# Patient Record
Sex: FEMALE
[De-identification: names, ages, dates, MRNs, and addresses within clinical notes are randomized per-mention and may not be internally consistent; named-entity substitution may affect disease eponyms.]

---

## 2020-11-25 ENCOUNTER — NON-APPOINTMENT (OUTPATIENT)
Age: 65
End: 2020-11-25

## 2020-11-25 DIAGNOSIS — I10 ESSENTIAL (PRIMARY) HYPERTENSION: ICD-10-CM

## 2020-11-25 DIAGNOSIS — Z82.49 FAMILY HISTORY OF ISCHEMIC HEART DISEASE AND OTHER DISEASES OF THE CIRCULATORY SYSTEM: ICD-10-CM

## 2020-11-25 DIAGNOSIS — Z78.9 OTHER SPECIFIED HEALTH STATUS: ICD-10-CM

## 2020-11-25 RX ORDER — ATORVASTATIN CALCIUM 40 MG/1
40 TABLET, FILM COATED ORAL DAILY
Refills: 0 | Status: ACTIVE | COMMUNITY

## 2020-11-25 RX ORDER — ASPIRIN ENTERIC COATED TABLETS 81 MG 81 MG/1
81 TABLET, DELAYED RELEASE ORAL DAILY
Refills: 0 | Status: ACTIVE | COMMUNITY

## 2020-11-25 RX ORDER — LEVOTHYROXINE SODIUM 0.1 MG/1
100 TABLET ORAL DAILY
Refills: 0 | Status: ACTIVE | COMMUNITY

## 2021-03-17 ENCOUNTER — APPOINTMENT (OUTPATIENT)
Dept: PULMONOLOGY | Facility: CLINIC | Age: 66
End: 2021-03-17
Payer: MEDICARE

## 2021-03-17 VITALS
HEART RATE: 89 BPM | DIASTOLIC BLOOD PRESSURE: 84 MMHG | OXYGEN SATURATION: 96 % | SYSTOLIC BLOOD PRESSURE: 177 MMHG | TEMPERATURE: 97.3 F

## 2021-03-17 PROCEDURE — 99204 OFFICE O/P NEW MOD 45 MIN: CPT

## 2021-03-17 PROCEDURE — 99214 OFFICE O/P EST MOD 30 MIN: CPT

## 2021-03-17 NOTE — PHYSICAL EXAM
[No Acute Distress] : no acute distress [Normal Appearance] : normal appearance [Normal Rate/Rhythm] : normal rate/rhythm [Normal S1, S2] : normal s1, s2 [No Murmurs] : no murmurs [No Resp Distress] : no resp distress [Clear to Auscultation Bilaterally] : clear to auscultation bilaterally [Benign] : benign [Normal Gait] : normal gait [No Clubbing] : no clubbing [No Cyanosis] : no cyanosis [Normal Color/ Pigmentation] : normal color/ pigmentation [No Focal Deficits] : no focal deficits [Oriented x3] : oriented x3

## 2021-04-05 NOTE — HISTORY OF PRESENT ILLNESS
[Never] : never [TextBox_4] : the patient is 65 year F with SOB and Asthma  She does not report any interval events   no cough at this time   she had a ct scan in 7/2020 for small nodules< 4mm

## 2021-04-05 NOTE — REASON FOR VISIT
[Follow-Up] : a follow-up visit [Asthma] : asthma [Shortness of Breath] : shortness of breath [TextBox_44] : is a 66 y/o Female. PT denies any complications and is overall feeling good. No fever, No chills, no SOB, or Wheezing. PT denies having any COVID like symptoms.

## 2021-07-06 ENCOUNTER — NON-APPOINTMENT (OUTPATIENT)
Age: 66
End: 2021-07-06

## 2021-07-29 ENCOUNTER — NON-APPOINTMENT (OUTPATIENT)
Age: 66
End: 2021-07-29

## 2021-08-04 ENCOUNTER — NON-APPOINTMENT (OUTPATIENT)
Age: 66
End: 2021-08-04

## 2021-08-04 ENCOUNTER — APPOINTMENT (OUTPATIENT)
Dept: PULMONOLOGY | Facility: CLINIC | Age: 66
End: 2021-08-04
Payer: MEDICARE

## 2021-08-04 VITALS
HEART RATE: 64 BPM | SYSTOLIC BLOOD PRESSURE: 161 MMHG | DIASTOLIC BLOOD PRESSURE: 84 MMHG | TEMPERATURE: 96.9 F | OXYGEN SATURATION: 96 %

## 2021-08-04 DIAGNOSIS — R91.8 OTHER NONSPECIFIC ABNORMAL FINDING OF LUNG FIELD: ICD-10-CM

## 2021-08-04 PROCEDURE — 99214 OFFICE O/P EST MOD 30 MIN: CPT

## 2021-08-04 RX ORDER — ALBUTEROL SULFATE 90 UG/1
108 (90 BASE) INHALANT RESPIRATORY (INHALATION)
Refills: 0 | Status: DISCONTINUED | COMMUNITY
End: 2021-08-04

## 2021-08-04 NOTE — ASSESSMENT
[FreeTextEntry1] : 8/3/2020 the patient has no new respiratory symptoms and uses her medications on a as needed basis 2) nodule in her lung was reviewed with her and they are all small and unchanged patient does not require any further CAT scans 3) follow-up in 1 year

## 2021-08-04 NOTE — HISTORY OF PRESENT ILLNESS
[Never] : never [TextBox_4] : the patient is 65 year F with SOB and Asthma  She does not report any interval events   no cough at this time other respiratory symptoms.  She had a ct scan in 7/2020 for small nodules< 4mm,   while the nodules are stable

## 2022-03-14 ENCOUNTER — NON-APPOINTMENT (OUTPATIENT)
Age: 67
End: 2022-03-14

## 2022-03-17 ENCOUNTER — APPOINTMENT (OUTPATIENT)
Dept: SURGERY | Facility: CLINIC | Age: 67
End: 2022-03-17
Payer: MEDICARE

## 2022-03-17 DIAGNOSIS — E07.9 DISORDER OF THYROID, UNSPECIFIED: ICD-10-CM

## 2022-03-17 PROCEDURE — 99204 OFFICE O/P NEW MOD 45 MIN: CPT

## 2022-03-17 NOTE — REASON FOR VISIT
[Initial Consultation] : an initial consultation for [FreeTextEntry2] : suspected hyperparathyroidism

## 2022-03-17 NOTE — PHYSICAL EXAM
[Midline] : located in midline position [Normal] : orientation to person, place, and time: normal [de-identified] : Extremities: NAZARIO x 4.   Skin: No obvious skin lesions.   Voice: clear

## 2022-03-19 ENCOUNTER — APPOINTMENT (OUTPATIENT)
Dept: ULTRASOUND IMAGING | Facility: CLINIC | Age: 67
End: 2022-03-19
Payer: MEDICARE

## 2022-03-19 ENCOUNTER — OUTPATIENT (OUTPATIENT)
Dept: OUTPATIENT SERVICES | Facility: HOSPITAL | Age: 67
LOS: 1 days | End: 2022-03-19
Payer: MEDICARE

## 2022-03-19 DIAGNOSIS — E03.9 HYPOTHYROIDISM, UNSPECIFIED: ICD-10-CM

## 2022-03-19 DIAGNOSIS — E83.52 HYPERCALCEMIA: ICD-10-CM

## 2022-03-19 PROCEDURE — 76536 US EXAM OF HEAD AND NECK: CPT

## 2022-03-19 PROCEDURE — 76536 US EXAM OF HEAD AND NECK: CPT | Mod: 26

## 2022-03-21 ENCOUNTER — NON-APPOINTMENT (OUTPATIENT)
Age: 67
End: 2022-03-21

## 2022-03-22 ENCOUNTER — NON-APPOINTMENT (OUTPATIENT)
Age: 67
End: 2022-03-22

## 2022-03-22 DIAGNOSIS — Z86.39 PERSONAL HISTORY OF OTHER ENDOCRINE, NUTRITIONAL AND METABOLIC DISEASE: ICD-10-CM

## 2022-03-22 DIAGNOSIS — E83.52 HYPERCALCEMIA: ICD-10-CM

## 2022-03-22 LAB
25(OH)D3 SERPL-MCNC: 28.2 NG/ML
CALCIUM SERPL-MCNC: 10.2 MG/DL
CALCIUM SERPL-MCNC: 10.2 MG/DL
CAU: 9 MG/DL
CREAT 24H UR-MCNC: 1.2 G/24 H
CREAT ?TM UR-MCNC: 52 MG/DL
PARATHYROID HORMONE INTACT: 80 PG/ML
PROT ?TM UR-MCNC: 24 HR
SPECIMEN VOL 24H UR: 216 MG/24 H
SPECIMEN VOL 24H UR: 2400 ML
T3 SERPL-MCNC: 95 NG/DL
T4 FREE SERPL-MCNC: 1.2 NG/DL
T4 SERPL-MCNC: 7.1 UG/DL
THYROGLOB AB SERPL-ACNC: <20 IU/ML
THYROPEROXIDASE AB SERPL IA-ACNC: 200 IU/ML
TSH SERPL-ACNC: 2.12 UIU/ML

## 2022-03-23 LAB — CA-I SERPL-SCNC: 5.6 MG/DL

## 2022-03-30 ENCOUNTER — TRANSCRIPTION ENCOUNTER (OUTPATIENT)
Age: 67
End: 2022-03-30

## 2022-03-31 ENCOUNTER — APPOINTMENT (OUTPATIENT)
Dept: SURGERY | Facility: CLINIC | Age: 67
End: 2022-03-31
Payer: MEDICARE

## 2022-03-31 PROCEDURE — 99212 OFFICE O/P EST SF 10 MIN: CPT

## 2022-04-18 ENCOUNTER — NON-APPOINTMENT (OUTPATIENT)
Age: 67
End: 2022-04-18

## 2022-04-19 ENCOUNTER — RESULT REVIEW (OUTPATIENT)
Age: 67
End: 2022-04-19

## 2022-04-19 ENCOUNTER — OUTPATIENT (OUTPATIENT)
Dept: OUTPATIENT SERVICES | Facility: HOSPITAL | Age: 67
LOS: 1 days | End: 2022-04-19
Payer: MEDICARE

## 2022-04-19 ENCOUNTER — APPOINTMENT (OUTPATIENT)
Dept: CT IMAGING | Facility: IMAGING CENTER | Age: 67
End: 2022-04-19
Payer: MEDICARE

## 2022-04-19 ENCOUNTER — APPOINTMENT (OUTPATIENT)
Dept: ULTRASOUND IMAGING | Facility: IMAGING CENTER | Age: 67
End: 2022-04-19
Payer: MEDICARE

## 2022-04-19 DIAGNOSIS — E04.2 NONTOXIC MULTINODULAR GOITER: ICD-10-CM

## 2022-04-19 PROCEDURE — 10006 FNA BX W/US GDN EA ADDL: CPT

## 2022-04-19 PROCEDURE — 70492 CT SFT TSUE NCK W/O & W/DYE: CPT | Mod: ME

## 2022-04-19 PROCEDURE — 10005 FNA BX W/US GDN 1ST LES: CPT

## 2022-04-19 PROCEDURE — 70491 CT SOFT TISSUE NECK W/DYE: CPT | Mod: 26,ME

## 2022-04-19 PROCEDURE — G1004: CPT

## 2022-04-19 PROCEDURE — 88173 CYTOPATH EVAL FNA REPORT: CPT | Mod: 26

## 2022-04-19 PROCEDURE — 88172 CYTP DX EVAL FNA 1ST EA SITE: CPT

## 2022-04-19 PROCEDURE — 88173 CYTOPATH EVAL FNA REPORT: CPT

## 2022-04-20 ENCOUNTER — NON-APPOINTMENT (OUTPATIENT)
Age: 67
End: 2022-04-20

## 2022-05-02 ENCOUNTER — APPOINTMENT (OUTPATIENT)
Dept: SURGERY | Facility: CLINIC | Age: 67
End: 2022-05-02
Payer: MEDICARE

## 2022-05-02 PROCEDURE — 99213 OFFICE O/P EST LOW 20 MIN: CPT

## 2022-05-05 ENCOUNTER — NON-APPOINTMENT (OUTPATIENT)
Age: 67
End: 2022-05-05

## 2022-05-05 ENCOUNTER — APPOINTMENT (OUTPATIENT)
Dept: RADIOLOGY | Facility: CLINIC | Age: 67
End: 2022-05-05

## 2022-05-06 LAB
25(OH)D3 SERPL-MCNC: 25.3 NG/ML
CALCIUM SERPL-MCNC: 11.1 MG/DL
CALCIUM SERPL-MCNC: 11.1 MG/DL
PARATHYROID HORMONE INTACT: 91 PG/ML

## 2022-05-07 LAB — CA-I SERPL-SCNC: 5.6 MG/DL

## 2022-05-16 ENCOUNTER — APPOINTMENT (OUTPATIENT)
Dept: RADIOLOGY | Facility: CLINIC | Age: 67
End: 2022-05-16

## 2022-05-16 ENCOUNTER — OUTPATIENT (OUTPATIENT)
Dept: OUTPATIENT SERVICES | Facility: HOSPITAL | Age: 67
LOS: 1 days | End: 2022-05-16

## 2022-05-16 DIAGNOSIS — E21.3 HYPERPARATHYROIDISM, UNSPECIFIED: ICD-10-CM

## 2022-05-16 DIAGNOSIS — M54.9 DORSALGIA, UNSPECIFIED: ICD-10-CM

## 2022-05-17 ENCOUNTER — TRANSCRIPTION ENCOUNTER (OUTPATIENT)
Age: 67
End: 2022-05-17

## 2022-06-07 ENCOUNTER — TRANSCRIPTION ENCOUNTER (OUTPATIENT)
Age: 67
End: 2022-06-07

## 2022-08-10 ENCOUNTER — APPOINTMENT (OUTPATIENT)
Dept: PULMONOLOGY | Facility: CLINIC | Age: 67
End: 2022-08-10

## 2022-08-10 ENCOUNTER — NON-APPOINTMENT (OUTPATIENT)
Age: 67
End: 2022-08-10

## 2022-08-10 VITALS
WEIGHT: 190 LBS | SYSTOLIC BLOOD PRESSURE: 140 MMHG | HEART RATE: 73 BPM | DIASTOLIC BLOOD PRESSURE: 80 MMHG | OXYGEN SATURATION: 97 % | BODY MASS INDEX: 28.14 KG/M2 | TEMPERATURE: 98.7 F | HEIGHT: 69 IN

## 2022-08-10 DIAGNOSIS — Z87.898 PERSONAL HISTORY OF OTHER SPECIFIED CONDITIONS: ICD-10-CM

## 2022-08-10 PROCEDURE — 94010 BREATHING CAPACITY TEST: CPT

## 2022-08-10 PROCEDURE — 99214 OFFICE O/P EST MOD 30 MIN: CPT | Mod: 25

## 2022-08-10 RX ORDER — METOPROLOL SUCCINATE 25 MG/1
25 TABLET, EXTENDED RELEASE ORAL DAILY
Refills: 0 | Status: DISCONTINUED | COMMUNITY
End: 2022-08-10

## 2022-08-10 RX ORDER — ROSUVASTATIN CALCIUM 20 MG/1
20 TABLET, FILM COATED ORAL
Qty: 90 | Refills: 0 | Status: ACTIVE | COMMUNITY
Start: 2022-01-14

## 2022-08-10 RX ORDER — LOSARTAN POTASSIUM 100 MG/1
100 TABLET, FILM COATED ORAL
Qty: 90 | Refills: 0 | Status: ACTIVE | COMMUNITY
Start: 2022-01-14

## 2022-08-10 RX ORDER — METOPROLOL SUCCINATE 50 MG/1
50 TABLET, EXTENDED RELEASE ORAL
Qty: 90 | Refills: 0 | Status: ACTIVE | COMMUNITY
Start: 2021-08-13

## 2022-08-10 NOTE — REASON FOR VISIT
[Follow-Up] : a follow-up visit [Asthma] : asthma [Wheezing] : wheezing [TextBox_44] : 1 year follow up.

## 2022-08-10 NOTE — ASSESSMENT
[FreeTextEntry1] : 8/3/2020 the patient has no new respiratory symptoms and uses her medications on a as needed basis 2) nodule in her lung was reviewed with her and they are all small and unchanged patient does not require any further CAT scans 3) follow-up in 1 year\par \par 8/10/22 The patient has a marked decline in the FEV1  last year 104% and this year 72%   She needs to use the advair as a maitenance medication every day    Will try to change to Breo   time spent 30 minutes counseling, education, documentation, review of spirometry with the patient, change of medication, script, deomonstration of proper inhaler use, HX/PE

## 2022-08-10 NOTE — HISTORY OF PRESENT ILLNESS
[Never] : never [TextBox_4] : the patient is 65 year F with SOB and Asthma  She does not report any interval events   no cough at this time other respiratory symptoms.  She had a ct scan in 7/2020 for small nodules< 4mm,   while the nodules are stable\par \par 8/10/22 The patient has had no major interval event She has benign nodules.She uses the Advair occasionally for a cough.She has some chest tightness as well

## 2022-08-10 NOTE — PHYSICAL EXAM
[No Acute Distress] : no acute distress [Normal Appearance] : normal appearance [Normal Rate/Rhythm] : normal rate/rhythm [Normal S1, S2] : normal s1, s2 [No Murmurs] : no murmurs [No Resp Distress] : no resp distress [Clear to Auscultation Bilaterally] : clear to auscultation bilaterally [Benign] : benign [Normal Gait] : normal gait [No Clubbing] : no clubbing [No Cyanosis] : no cyanosis [Normal Color/ Pigmentation] : normal color/ pigmentation [No Focal Deficits] : no focal deficits [Oriented x3] : oriented x3 [TextBox_2] : Bmi 28

## 2022-10-12 ENCOUNTER — APPOINTMENT (OUTPATIENT)
Dept: PULMONOLOGY | Facility: CLINIC | Age: 67
End: 2022-10-12

## 2022-10-12 ENCOUNTER — NON-APPOINTMENT (OUTPATIENT)
Age: 67
End: 2022-10-12

## 2022-10-12 VITALS
HEART RATE: 73 BPM | HEIGHT: 69 IN | SYSTOLIC BLOOD PRESSURE: 140 MMHG | BODY MASS INDEX: 28.14 KG/M2 | TEMPERATURE: 96.8 F | DIASTOLIC BLOOD PRESSURE: 72 MMHG | WEIGHT: 190 LBS | OXYGEN SATURATION: 98 %

## 2022-10-12 DIAGNOSIS — Z86.79 PERSONAL HISTORY OF OTHER DISEASES OF THE CIRCULATORY SYSTEM: ICD-10-CM

## 2022-10-12 DIAGNOSIS — Z86.39 PERSONAL HISTORY OF OTHER ENDOCRINE, NUTRITIONAL AND METABOLIC DISEASE: ICD-10-CM

## 2022-10-12 DIAGNOSIS — Z23 ENCOUNTER FOR IMMUNIZATION: ICD-10-CM

## 2022-10-12 PROCEDURE — 99214 OFFICE O/P EST MOD 30 MIN: CPT

## 2022-10-12 RX ORDER — FLUTICASONE PROPIONATE AND SALMETEROL 50; 100 UG/1; UG/1
100-50 POWDER RESPIRATORY (INHALATION) TWICE DAILY
Refills: 0 | Status: DISCONTINUED | COMMUNITY
End: 2022-10-12

## 2022-10-12 RX ORDER — FLUTICASONE FUROATE AND VILANTEROL TRIFENATATE 200; 25 UG/1; UG/1
200-25 POWDER RESPIRATORY (INHALATION)
Qty: 3 | Refills: 3 | Status: DISCONTINUED | COMMUNITY
Start: 2022-08-10 | End: 2022-10-12

## 2022-10-12 NOTE — HISTORY OF PRESENT ILLNESS
[Never] : never [TextBox_4] : the patient is 65 year F with SOB and Asthma  She does not report any interval events   no cough at this time other respiratory symptoms.  She had a ct scan in 7/2020 for small nodules< 4mm,   while the nodules are stable\par \par 8/10/22 The patient has had no major interval event She has benign nodules.She uses the Advair occasionally for a cough.She has some chest tightness as well  \par \par 10/12/2022 the patient returns the office to recheck her spirometry to see whether she has improved over the fall and her FEV1 and FVC on her last visit in August..  The patient did not feel that the inhaler, Trelegy, was helpful.  She saw her her PCP Dr. Riley and he placed her on montelukast which seemed to help her.  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  And although it helped her breathing she stopped the medication.  She complains of feeling of fullness or tightness in her chest and no not really having dyspnea at this time is had no intervening adverse medical events nor can she define any sort of stress that would give her anxiety.

## 2022-10-12 NOTE — ASSESSMENT
[FreeTextEntry1] : 8/3/2020 the patient has no new respiratory symptoms and uses her medications on a as needed basis 2) nodule in her lung was reviewed with her and they are all small and unchanged patient does not require any further CAT scans 3) follow-up in 1 year\par \par 8/10/22 The patient has a marked decline in the FEV1  last year 104% and this year 72%   She needs to use the advair as a maitenance medication every day    Will try to change to Breo   time spent 30 minutes counseling, education, documentation, review of spirometry with the patient, change of medication, script, deomonstration of proper inhaler use, HX/PE  \par \par 1/12/2022 the patient's repeat spirometry was much improved her FEV1 and FVC were in the % range.  The topic of exam anxiety was brought up with the patient she is not able to define any specific anxiety that would be causing her chest tightness.  She understands at that is a possibility and is willing to trial Xanax for1-2weeks.  Patient was prescribed Xanax 0.25 mg twice a day but she will start off with only 1 pill a day she will return to the office in 4 months but will call at the end of the week of trial therapy.  Patient was also concerned about her previous groundglass infiltrate in her lung I have told her I do not think this is causing any difficulty breathing she will have a follow-up PFT depending upon the PFT will consider repeating the CAT scan of the chest time spent 30 minutes counseling, education, documentation, imaging reviewed, medication reviewed, , old records reviewed, HX and PE

## 2022-10-12 NOTE — REASON FOR VISIT
[Asthma] : asthma [TextBox_44] : is presenting for a 2 month follow up.  Patient has no pulmonary complaints.  Patient states that the Breo wasn't helpful at all, and her PCP gave her Singulair in lc of.

## 2023-02-07 ENCOUNTER — APPOINTMENT (OUTPATIENT)
Dept: PULMONOLOGY | Facility: CLINIC | Age: 68
End: 2023-02-07
Payer: MEDICARE

## 2023-02-07 VITALS
WEIGHT: 190 LBS | TEMPERATURE: 96.7 F | HEART RATE: 73 BPM | BODY MASS INDEX: 28.14 KG/M2 | SYSTOLIC BLOOD PRESSURE: 128 MMHG | OXYGEN SATURATION: 98 % | DIASTOLIC BLOOD PRESSURE: 80 MMHG | HEIGHT: 69 IN

## 2023-02-07 DIAGNOSIS — J45.30 MILD PERSISTENT ASTHMA, UNCOMPLICATED: ICD-10-CM

## 2023-02-07 PROCEDURE — 99213 OFFICE O/P EST LOW 20 MIN: CPT

## 2023-02-07 NOTE — ASSESSMENT
[FreeTextEntry1] : 8/3/2020 the patient has no new respiratory symptoms and uses her medications on a as needed basis 2) nodule in her lung was reviewed with her and they are all small and unchanged patient does not require any further CAT scans 3) follow-up in 1 year\par \par 8/10/22 The patient has a marked decline in the FEV1  last year 104% and this year 72%   She needs to use the advair as a maitenance medication every day    Will try to change to Breo   time spent 30 minutes counseling, education, documentation, review of spirometry with the patient, change of medication, script, deomonstration of proper inhaler use, HX/PE  \par \par 1/12/2022 the patient's repeat spirometry was much improved her FEV1 and FVC were in the % range.  The topic of exam anxiety was brought up with the patient she is not able to define any specific anxiety that would be causing her chest tightness.  She understands at that is a possibility and is willing to trial Xanax for1-2weeks.  Patient was prescribed Xanax 0.25 mg twice a day but she will start off with only 1 pill a day she will return to the office in 4 months but will call at the end of the week of trial therapy.  Patient was also concerned about her previous groundglass infiltrate in her lung I have told her I do not think this is causing any difficulty breathing she will have a follow-up PFT depending upon the PFT will consider repeating the CAT scan of the chest time spent 30 minutes counseling, education, documentation, imaging reviewed, medication reviewed, , old records reviewed, HX and PE \par \par 2/7/23 The patient is doing well and has no new  or residual  resp  Sx   IT is not clear exactly what was causing her Sxx  there is no reasn at this time to repeat a Ct scan f/u 1 yr

## 2023-02-07 NOTE — REASON FOR VISIT
[Follow-Up] : a follow-up visit [Asthma] : asthma [TextBox_44] : 4 month, no symptoms. Mild persistent asthma without complication, other nonspecific abnormal finding of lung field.

## 2023-02-07 NOTE — HISTORY OF PRESENT ILLNESS
[TextBox_4] : the patient is 65 year F with SOB and Asthma  She does not report any interval events   no cough at this time other respiratory symptoms.  She had a ct scan in 7/2020 for small nodules< 4mm,   while the nodules are stable\par \par 8/10/22 The patient has had no major interval event She has benign nodules.She uses the Advair occasionally for a cough.She has some chest tightness as well  \par \par 10/12/2022 the patient returns the office to recheck her spirometry to see whether she has improved over the fall and her FEV1 and FVC on her last visit in August..  The patient did not feel that the inhaler, Trelegy, was helpful.  She saw her her PCP Dr. Riley and he placed her on montelukast which seemed to help her.  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  And although it helped her breathing she stopped the medication.  She complains of feeling of fullness or tightness in her chest and no not really having dyspnea at this time is had no intervening adverse medical events nor can she define any sort of stress that would give her anxiety.\par \par 2/7/23  The patient is doing well she is not using any medications

## 2023-02-28 ENCOUNTER — NON-APPOINTMENT (OUTPATIENT)
Age: 68
End: 2023-02-28

## 2023-06-25 ENCOUNTER — NON-APPOINTMENT (OUTPATIENT)
Age: 68
End: 2023-06-25

## 2023-07-03 ENCOUNTER — NON-APPOINTMENT (OUTPATIENT)
Age: 68
End: 2023-07-03

## 2023-07-03 ENCOUNTER — APPOINTMENT (OUTPATIENT)
Dept: ULTRASOUND IMAGING | Facility: CLINIC | Age: 68
End: 2023-07-03
Payer: MEDICARE

## 2023-07-03 ENCOUNTER — OUTPATIENT (OUTPATIENT)
Dept: OUTPATIENT SERVICES | Facility: HOSPITAL | Age: 68
LOS: 1 days | End: 2023-07-03
Payer: MEDICARE

## 2023-07-03 DIAGNOSIS — E21.3 HYPERPARATHYROIDISM, UNSPECIFIED: ICD-10-CM

## 2023-07-03 DIAGNOSIS — E04.2 NONTOXIC MULTINODULAR GOITER: ICD-10-CM

## 2023-07-03 PROCEDURE — 76536 US EXAM OF HEAD AND NECK: CPT

## 2023-07-03 PROCEDURE — 76536 US EXAM OF HEAD AND NECK: CPT | Mod: 26

## 2023-07-20 ENCOUNTER — NON-APPOINTMENT (OUTPATIENT)
Age: 68
End: 2023-07-20

## 2023-07-20 ENCOUNTER — APPOINTMENT (OUTPATIENT)
Dept: SURGERY | Facility: CLINIC | Age: 68
End: 2023-07-20
Payer: MEDICARE

## 2023-07-20 VITALS
OXYGEN SATURATION: 99 % | SYSTOLIC BLOOD PRESSURE: 170 MMHG | DIASTOLIC BLOOD PRESSURE: 83 MMHG | BODY MASS INDEX: 28.14 KG/M2 | WEIGHT: 190 LBS | HEART RATE: 80 BPM | HEIGHT: 69 IN

## 2023-07-20 PROCEDURE — 99214 OFFICE O/P EST MOD 30 MIN: CPT

## 2023-07-20 NOTE — PHYSICAL EXAM
[Normal] : orientation to person, place, and time: normal [R] : deviated to the right [de-identified] : mild [de-identified] : Extremities: NAZARIO x 4.   Skin: No obvious skin lesions.   Voice: clear

## 2023-07-21 LAB
25(OH)D3 SERPL-MCNC: 28.3 NG/ML
APTT BLD: 35.2 SEC
CALCIUM SERPL-MCNC: 11.2 MG/DL
CALCIUM SERPL-MCNC: 11.2 MG/DL
INR PPP: 1.01 RATIO
PARATHYROID HORMONE INTACT: 84 PG/ML
PT BLD: 11.7 SEC

## 2023-08-07 ENCOUNTER — APPOINTMENT (OUTPATIENT)
Dept: NUCLEAR MEDICINE | Facility: CLINIC | Age: 68
End: 2023-08-07
Payer: MEDICARE

## 2023-08-07 ENCOUNTER — OUTPATIENT (OUTPATIENT)
Dept: OUTPATIENT SERVICES | Facility: HOSPITAL | Age: 68
LOS: 1 days | End: 2023-08-07

## 2023-08-07 DIAGNOSIS — E21.3 HYPERPARATHYROIDISM, UNSPECIFIED: ICD-10-CM

## 2023-08-07 PROCEDURE — 78072 PARATHYRD PLANAR W/SPECT&CT: CPT | Mod: 26

## 2023-09-08 ENCOUNTER — NON-APPOINTMENT (OUTPATIENT)
Age: 68
End: 2023-09-08

## 2023-09-08 ENCOUNTER — APPOINTMENT (OUTPATIENT)
Dept: PULMONOLOGY | Facility: CLINIC | Age: 68
End: 2023-09-08
Payer: MEDICARE

## 2023-09-08 VITALS
SYSTOLIC BLOOD PRESSURE: 162 MMHG | HEART RATE: 73 BPM | BODY MASS INDEX: 28.14 KG/M2 | WEIGHT: 190 LBS | HEIGHT: 69 IN | OXYGEN SATURATION: 98 % | DIASTOLIC BLOOD PRESSURE: 92 MMHG | TEMPERATURE: 97.2 F

## 2023-09-08 PROCEDURE — 94010 BREATHING CAPACITY TEST: CPT

## 2023-09-08 PROCEDURE — 99214 OFFICE O/P EST MOD 30 MIN: CPT | Mod: 25

## 2023-10-23 ENCOUNTER — OUTPATIENT (OUTPATIENT)
Dept: OUTPATIENT SERVICES | Facility: HOSPITAL | Age: 68
LOS: 1 days | End: 2023-10-23
Payer: MEDICARE

## 2023-10-23 VITALS
HEART RATE: 88 BPM | HEIGHT: 69 IN | WEIGHT: 214.95 LBS | TEMPERATURE: 97 F | DIASTOLIC BLOOD PRESSURE: 73 MMHG | OXYGEN SATURATION: 98 % | SYSTOLIC BLOOD PRESSURE: 171 MMHG | RESPIRATION RATE: 18 BRPM

## 2023-10-23 DIAGNOSIS — Z01.818 ENCOUNTER FOR OTHER PREPROCEDURAL EXAMINATION: ICD-10-CM

## 2023-10-23 DIAGNOSIS — Z98.890 OTHER SPECIFIED POSTPROCEDURAL STATES: Chronic | ICD-10-CM

## 2023-10-23 DIAGNOSIS — Z96.651 PRESENCE OF RIGHT ARTIFICIAL KNEE JOINT: Chronic | ICD-10-CM

## 2023-10-23 DIAGNOSIS — E21.3 HYPERPARATHYROIDISM, UNSPECIFIED: ICD-10-CM

## 2023-10-23 LAB
ALBUMIN SERPL ELPH-MCNC: 3.8 G/DL — SIGNIFICANT CHANGE UP (ref 3.3–5)
ALBUMIN SERPL ELPH-MCNC: 3.8 G/DL — SIGNIFICANT CHANGE UP (ref 3.3–5)
ALP SERPL-CCNC: 100 U/L — SIGNIFICANT CHANGE UP (ref 40–120)
ALP SERPL-CCNC: 100 U/L — SIGNIFICANT CHANGE UP (ref 40–120)
ALT FLD-CCNC: 56 U/L — SIGNIFICANT CHANGE UP (ref 12–78)
ALT FLD-CCNC: 56 U/L — SIGNIFICANT CHANGE UP (ref 12–78)
ANION GAP SERPL CALC-SCNC: 6 MMOL/L — SIGNIFICANT CHANGE UP (ref 5–17)
ANION GAP SERPL CALC-SCNC: 6 MMOL/L — SIGNIFICANT CHANGE UP (ref 5–17)
APTT BLD: 39.2 SEC — HIGH (ref 24.5–35.6)
APTT BLD: 39.2 SEC — HIGH (ref 24.5–35.6)
AST SERPL-CCNC: 32 U/L — SIGNIFICANT CHANGE UP (ref 15–37)
AST SERPL-CCNC: 32 U/L — SIGNIFICANT CHANGE UP (ref 15–37)
BILIRUB SERPL-MCNC: 0.3 MG/DL — SIGNIFICANT CHANGE UP (ref 0.2–1.2)
BILIRUB SERPL-MCNC: 0.3 MG/DL — SIGNIFICANT CHANGE UP (ref 0.2–1.2)
BUN SERPL-MCNC: 13 MG/DL — SIGNIFICANT CHANGE UP (ref 7–23)
BUN SERPL-MCNC: 13 MG/DL — SIGNIFICANT CHANGE UP (ref 7–23)
CALCIUM SERPL-MCNC: 10.8 MG/DL — HIGH (ref 8.5–10.1)
CALCIUM SERPL-MCNC: 10.8 MG/DL — HIGH (ref 8.5–10.1)
CHLORIDE SERPL-SCNC: 109 MMOL/L — HIGH (ref 96–108)
CHLORIDE SERPL-SCNC: 109 MMOL/L — HIGH (ref 96–108)
CO2 SERPL-SCNC: 28 MMOL/L — SIGNIFICANT CHANGE UP (ref 22–31)
CO2 SERPL-SCNC: 28 MMOL/L — SIGNIFICANT CHANGE UP (ref 22–31)
CREAT SERPL-MCNC: 0.61 MG/DL — SIGNIFICANT CHANGE UP (ref 0.5–1.3)
CREAT SERPL-MCNC: 0.61 MG/DL — SIGNIFICANT CHANGE UP (ref 0.5–1.3)
EGFR: 96 ML/MIN/1.73M2 — SIGNIFICANT CHANGE UP
EGFR: 96 ML/MIN/1.73M2 — SIGNIFICANT CHANGE UP
GLUCOSE SERPL-MCNC: 139 MG/DL — HIGH (ref 70–99)
GLUCOSE SERPL-MCNC: 139 MG/DL — HIGH (ref 70–99)
HCT VFR BLD CALC: 42.9 % — SIGNIFICANT CHANGE UP (ref 34.5–45)
HCT VFR BLD CALC: 42.9 % — SIGNIFICANT CHANGE UP (ref 34.5–45)
HGB BLD-MCNC: 14.1 G/DL — SIGNIFICANT CHANGE UP (ref 11.5–15.5)
HGB BLD-MCNC: 14.1 G/DL — SIGNIFICANT CHANGE UP (ref 11.5–15.5)
INR BLD: 0.94 RATIO — SIGNIFICANT CHANGE UP (ref 0.85–1.18)
INR BLD: 0.94 RATIO — SIGNIFICANT CHANGE UP (ref 0.85–1.18)
MCHC RBC-ENTMCNC: 29.7 PG — SIGNIFICANT CHANGE UP (ref 27–34)
MCHC RBC-ENTMCNC: 29.7 PG — SIGNIFICANT CHANGE UP (ref 27–34)
MCHC RBC-ENTMCNC: 32.9 GM/DL — SIGNIFICANT CHANGE UP (ref 32–36)
MCHC RBC-ENTMCNC: 32.9 GM/DL — SIGNIFICANT CHANGE UP (ref 32–36)
MCV RBC AUTO: 90.5 FL — SIGNIFICANT CHANGE UP (ref 80–100)
MCV RBC AUTO: 90.5 FL — SIGNIFICANT CHANGE UP (ref 80–100)
NRBC # BLD: 0 /100 WBCS — SIGNIFICANT CHANGE UP (ref 0–0)
NRBC # BLD: 0 /100 WBCS — SIGNIFICANT CHANGE UP (ref 0–0)
PLATELET # BLD AUTO: 312 K/UL — SIGNIFICANT CHANGE UP (ref 150–400)
PLATELET # BLD AUTO: 312 K/UL — SIGNIFICANT CHANGE UP (ref 150–400)
POTASSIUM SERPL-MCNC: 4.1 MMOL/L — SIGNIFICANT CHANGE UP (ref 3.5–5.3)
POTASSIUM SERPL-MCNC: 4.1 MMOL/L — SIGNIFICANT CHANGE UP (ref 3.5–5.3)
POTASSIUM SERPL-SCNC: 4.1 MMOL/L — SIGNIFICANT CHANGE UP (ref 3.5–5.3)
POTASSIUM SERPL-SCNC: 4.1 MMOL/L — SIGNIFICANT CHANGE UP (ref 3.5–5.3)
PROT SERPL-MCNC: 8 G/DL — SIGNIFICANT CHANGE UP (ref 6–8.3)
PROT SERPL-MCNC: 8 G/DL — SIGNIFICANT CHANGE UP (ref 6–8.3)
PROTHROM AB SERPL-ACNC: 11 SEC — SIGNIFICANT CHANGE UP (ref 9.5–13)
PROTHROM AB SERPL-ACNC: 11 SEC — SIGNIFICANT CHANGE UP (ref 9.5–13)
RBC # BLD: 4.74 M/UL — SIGNIFICANT CHANGE UP (ref 3.8–5.2)
RBC # BLD: 4.74 M/UL — SIGNIFICANT CHANGE UP (ref 3.8–5.2)
RBC # FLD: 12.8 % — SIGNIFICANT CHANGE UP (ref 10.3–14.5)
RBC # FLD: 12.8 % — SIGNIFICANT CHANGE UP (ref 10.3–14.5)
SODIUM SERPL-SCNC: 143 MMOL/L — SIGNIFICANT CHANGE UP (ref 135–145)
SODIUM SERPL-SCNC: 143 MMOL/L — SIGNIFICANT CHANGE UP (ref 135–145)
WBC # BLD: 8.28 K/UL — SIGNIFICANT CHANGE UP (ref 3.8–10.5)
WBC # BLD: 8.28 K/UL — SIGNIFICANT CHANGE UP (ref 3.8–10.5)
WBC # FLD AUTO: 8.28 K/UL — SIGNIFICANT CHANGE UP (ref 3.8–10.5)
WBC # FLD AUTO: 8.28 K/UL — SIGNIFICANT CHANGE UP (ref 3.8–10.5)

## 2023-10-23 PROCEDURE — 93005 ELECTROCARDIOGRAM TRACING: CPT

## 2023-10-23 PROCEDURE — 86850 RBC ANTIBODY SCREEN: CPT

## 2023-10-23 PROCEDURE — 85610 PROTHROMBIN TIME: CPT

## 2023-10-23 PROCEDURE — 86901 BLOOD TYPING SEROLOGIC RH(D): CPT

## 2023-10-23 PROCEDURE — 86900 BLOOD TYPING SEROLOGIC ABO: CPT

## 2023-10-23 PROCEDURE — 85730 THROMBOPLASTIN TIME PARTIAL: CPT

## 2023-10-23 PROCEDURE — 85027 COMPLETE CBC AUTOMATED: CPT

## 2023-10-23 PROCEDURE — 93010 ELECTROCARDIOGRAM REPORT: CPT

## 2023-10-23 PROCEDURE — G0463: CPT

## 2023-10-23 PROCEDURE — 80053 COMPREHEN METABOLIC PANEL: CPT

## 2023-10-23 PROCEDURE — 36415 COLL VENOUS BLD VENIPUNCTURE: CPT

## 2023-10-23 RX ORDER — ROSUVASTATIN CALCIUM 5 MG/1
1 TABLET ORAL
Refills: 0 | DISCHARGE

## 2023-10-23 RX ORDER — METOPROLOL TARTRATE 50 MG
1 TABLET ORAL
Refills: 0 | DISCHARGE

## 2023-10-23 RX ORDER — EZETIMIBE 10 MG/1
1 TABLET ORAL
Refills: 0 | DISCHARGE

## 2023-10-23 RX ORDER — LEVOTHYROXINE SODIUM 125 MCG
1 TABLET ORAL
Refills: 0 | DISCHARGE

## 2023-10-23 RX ORDER — LOSARTAN POTASSIUM 100 MG/1
1 TABLET, FILM COATED ORAL
Refills: 0 | DISCHARGE

## 2023-10-23 NOTE — H&P PST ADULT - NSICDXPASTMEDICALHX_GEN_ALL_CORE_FT
PAST MEDICAL HISTORY:  Benign essential HTN     H/O Hashimoto thyroiditis     HLD (hyperlipidemia)     Hyperparathyroidism, unspecified     Hypothyroidism

## 2023-10-23 NOTE — H&P PST ADULT - MUSCULOSKELETAL
normal/ROM intact/normal gait/strength 5/5 bilateral upper extremities/strength 5/5 bilateral lower extremities details… some scoliosis/normal/ROM intact/normal gait/strength 5/5 bilateral upper extremities/strength 5/5 bilateral lower extremities

## 2023-10-23 NOTE — H&P PST ADULT - HISTORY OF PRESENT ILLNESS
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   She is scheduled for parathyroidectomy with PTH lennie- [facility]- [facility] with Dr. Maier on 11/03/2023.    Reports having   went for CT scan / MRI  /Ultrasound which confirmed the diagnosis   and after consulting with the surgeon agreed to have the above mentioned surgery.    Denies any at this time.     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   She is scheduled for parathyroidectomy with PTH lennie- [facility]- [facility] with Dr. Maier on 11/03/2023.    Reports having elevated calcium levels and being followed by Dr Maier, had US and Sestamibi scan showing increased uptake within a 0.8 cm nodule  went for CT scan / MRI  /Ultrasound which confirmed the diagnosis   and after consulting with the surgeon agreed to have the above mentioned surgery.    Denies any at this time.     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   She is scheduled for parathyroidectomy with PTH assay- [facility].- [facility] with Dr. Maier on 11/03/2023.    Reports having elevated calcium levels and being followed by Dr Maier, had US and Sestamibi scan showing increased uptake within a 0.7 cm nodule which was suspected to represent a parathyroid lesion and after discussing the results with Dr Maier agreed to have the above mentioned surgery.    Denies any pain or SOB at this time.

## 2023-10-23 NOTE — H&P PST ADULT - ASSESSMENT
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   She is scheduled for parathyroidectomy with PTH assay- [facility].- [facility] with Dr. Maier on 11/03/2023.

## 2023-10-23 NOTE — H&P PST ADULT - PROBLEM SELECTOR PLAN 2
* Following labs ordered-CBC, CMP, Type and screen, PT, PTT, INR  * Following diagnostics ordered- EKG.   * Going for medical clearance with Dr. Riley on 10/30/2023.  * Stop any herbal remedies, vitamin supplements or any medication that contains Aspirin , Ibuprofen, Advil, Motrin or Aleve at least 7 days before surgery.  * Leave all valuables at home, No lotion or eye makeup on the day of surgery.  * No illegal drugs for 7 days prior to surgery and no alcohol 24 hrs before procedure.  * Preop instructions explained. Verbalized understanding.   * Please take the following meds on the day of surgery with sips of water- Levothyroxine, Metoprolol, Losartan  * Hibiclens scrub explained and provided.

## 2023-10-23 NOTE — H&P PST ADULT - NSICDXFAMILYHX_GEN_ALL_CORE_FT
FAMILY HISTORY:  Father  Still living? No  FH: heart attack, Age at diagnosis: Age Unknown    Sibling  Still living? No  FH: kidney failure, Age at diagnosis: Age Unknown

## 2023-10-23 NOTE — H&P PST ADULT - PROBLEM SELECTOR PLAN 1
She is scheduled for parathyroidectomy with PTH assay- [facility] with Dr. Maier on 11/03/2023.

## 2023-11-01 DIAGNOSIS — Z00.00 ENCOUNTER FOR GENERAL ADULT MEDICAL EXAMINATION W/OUT ABNORMAL FINDINGS: ICD-10-CM

## 2023-11-02 DIAGNOSIS — R79.1 ABNORMAL COAGULATION PROFILE: ICD-10-CM

## 2023-11-02 LAB
APTT BLD: 37.7 SEC
INR PPP: 0.97 RATIO
PT BLD: 11 SEC

## 2023-11-03 ENCOUNTER — APPOINTMENT (OUTPATIENT)
Dept: SURGERY | Facility: HOSPITAL | Age: 68
End: 2023-11-03

## 2023-11-09 ENCOUNTER — APPOINTMENT (OUTPATIENT)
Dept: SURGERY | Facility: CLINIC | Age: 68
End: 2023-11-09

## 2024-01-16 ENCOUNTER — NON-APPOINTMENT (OUTPATIENT)
Age: 69
End: 2024-01-16

## 2024-02-06 PROBLEM — E78.5 HYPERLIPIDEMIA, UNSPECIFIED: Chronic | Status: ACTIVE | Noted: 2023-10-23

## 2024-02-06 PROBLEM — I10 ESSENTIAL (PRIMARY) HYPERTENSION: Chronic | Status: ACTIVE | Noted: 2023-10-23

## 2024-02-06 PROBLEM — E21.3 HYPERPARATHYROIDISM, UNSPECIFIED: Chronic | Status: ACTIVE | Noted: 2023-10-23

## 2024-02-06 PROBLEM — E03.9 HYPOTHYROIDISM, UNSPECIFIED: Chronic | Status: ACTIVE | Noted: 2023-10-23

## 2024-02-06 PROBLEM — Z86.39 PERSONAL HISTORY OF OTHER ENDOCRINE, NUTRITIONAL AND METABOLIC DISEASE: Chronic | Status: ACTIVE | Noted: 2023-10-23

## 2024-02-19 NOTE — REVIEW OF SYSTEMS
[Arthralgias] : arthralgias [Myalgias] : myalgias [Back Pain] : back pain [Negative] : Psychiatric [TextBox_30] : None satisfactory incomplete breath

## 2024-02-19 NOTE — REASON FOR VISIT
[Follow-Up] : a follow-up visit [Asthma] : asthma [Pulmonary Nodules] : pulmonary nodules [TextBox_44] : 7 month.  Patient states she is having a Parathyroidectomy in November with Dr Maier.  Patient states she has been having some episodes of breathing difficulty.  She thought it was anxiety but it is not.

## 2024-02-19 NOTE — ASSESSMENT
[FreeTextEntry1] : 8/3/2020 the patient has no new respiratory symptoms and uses her medications on a as needed basis 2) nodule in her lung was reviewed with her and they are all small and unchanged patient does not require any further CAT scans 3) follow-up in 1 year  8/10/22 The patient has a marked decline in the FEV1  last year 104% and this year 72%   She needs to use the advair as a maitenance medication every day    Will try to change to Breo   time spent 30 minutes counseling, education, documentation, review of spirometry with the patient, change of medication, script, deomonstration of proper inhaler use, HX/PE    1/12/2022 the patient's repeat spirometry was much improved her FEV1 and FVC were in the % range.  The topic of exam anxiety was brought up with the patient she is not able to define any specific anxiety that would be causing her chest tightness.  She understands at that is a possibility and is willing to trial Xanax for1-2weeks.  Patient was prescribed Xanax 0.25 mg twice a day but she will start off with only 1 pill a day she will return to the office in 4 months but will call at the end of the week of trial therapy.  Patient was also concerned about her previous groundglass infiltrate in her lung I have told her I do not think this is causing any difficulty breathing she will have a follow-up PFT depending upon the PFT will consider repeating the CAT scan of the chest time spent 30 minutes counseling, education, documentation, imaging reviewed, medication reviewed, , old records reviewed, HX and PE   2/7/23 The patient is doing well and has no new  or residual  resp  Sx   IT is not clear exactly what was causing her Sxx  there is no reasn at this time to repeat a Ct scan f/u 1 yr    8/20/2023 Kaylin Moore is a 68-year-old female preop evaluation prior to parathyroid tumor surgery.  The patient had a spirometry in the office.  Spirometry shows the median predicted to be an FEV1 of 2.76 patient's best is 2.3 4, 85% of predicted which is within normal range.  The mid lung flows are also normal.  Patient has no significant pulmonary disease process that would affect her surgery.  There is no pulmonary contraindication that proposed surgery.  The patient has no increased pulmonary risk to the proposed parathyroid surgery under conscious sedation or general anesthesia.  The patient will get a follow-up full pulmonary function test as previously planned in February.  Time spent 30 minutes counseling, education, documentation, imaging reviewed, medication reviewed, , old records reviewed, HX and PE

## 2024-02-19 NOTE — HISTORY OF PRESENT ILLNESS
[Never] : never [TextBox_4] : 9/8/23 The patient is a 67 yo female and she has primary hyperparathyroidism with a rising Calcium to 11+.  She was asked by her endo surgeon to have a pulmonary evaluation prior to surgery.  Patient complains of not getting satisfying breaths and left with a "empty feeling in her chest.  Patient has not had to decrease her functional activities.  Patient is due to have a pulmonary function test in November.  Patient will have spirometry done today.

## 2024-02-21 ENCOUNTER — APPOINTMENT (OUTPATIENT)
Dept: PULMONOLOGY | Facility: CLINIC | Age: 69
End: 2024-02-21

## 2024-03-08 ENCOUNTER — APPOINTMENT (OUTPATIENT)
Dept: PULMONOLOGY | Facility: CLINIC | Age: 69
End: 2024-03-08
Payer: MEDICARE

## 2024-03-08 VITALS
BODY MASS INDEX: 31.1 KG/M2 | WEIGHT: 210 LBS | OXYGEN SATURATION: 98 % | HEIGHT: 69 IN | HEART RATE: 74 BPM | SYSTOLIC BLOOD PRESSURE: 132 MMHG | DIASTOLIC BLOOD PRESSURE: 86 MMHG | TEMPERATURE: 97.5 F

## 2024-03-08 DIAGNOSIS — J45.909 UNSPECIFIED ASTHMA, UNCOMPLICATED: ICD-10-CM

## 2024-03-08 DIAGNOSIS — R06.09 OTHER FORMS OF DYSPNEA: ICD-10-CM

## 2024-03-08 PROCEDURE — 99214 OFFICE O/P EST MOD 30 MIN: CPT

## 2024-03-08 RX ORDER — EZETIMIBE 10 MG/1
10 TABLET ORAL
Refills: 0 | Status: DISCONTINUED | COMMUNITY
End: 2024-03-08

## 2024-03-08 RX ORDER — MONTELUKAST SODIUM 10 MG/1
10 TABLET, FILM COATED ORAL
Refills: 0 | Status: DISCONTINUED | COMMUNITY
End: 2024-03-08

## 2024-03-08 RX ORDER — ALBUTEROL SULFATE 90 UG/1
108 (90 BASE) INHALANT RESPIRATORY (INHALATION)
Qty: 1 | Refills: 6 | Status: ACTIVE | COMMUNITY
Start: 2024-03-08 | End: 1900-01-01

## 2024-03-08 RX ORDER — ALPRAZOLAM 0.25 MG/1
0.25 TABLET ORAL
Qty: 28 | Refills: 0 | Status: DISCONTINUED | COMMUNITY
Start: 2022-10-12 | End: 2024-03-08

## 2024-03-08 NOTE — HISTORY OF PRESENT ILLNESS
[Never] : never [TextBox_4] : 9/8/23 The patient is a 67 yo female and she has primary hyperparathyroidism with a rising Calcium to 11+. She was asked by her endo surgeon to have a pulmonary evaluation prior to surgery. Patient complains of not getting satisfying breaths and left with a "empty feeling in her chest. Patient has not had to decrease her functional activities. Patient is due to have a pulmonary function test in November. Patient will have spirometry done today.   Smoking Status: never   Fernando: never   Marijuana use: never  3/8/2024 Kaylin Moore is a 68-year-old female with a history of intermittent respiratory symptoms.  The patient has hyperparathyroidism and is to have surgery with that has been delayed.  The patient complains of heaviness in her chest and some chest heaviness on the left side.  Patient has gone for cardiac evaluation but that information is not back.  The patient's had multiple spirometry's in the office and they have been normal.  Patient has had no acute respiratory infection or inhalation that she is aware of.  She is not coughing or of any sputum production.  There is no active wheezing.

## 2024-03-08 NOTE — REASON FOR VISIT
[Follow-Up] : a follow-up visit [Asthma] : asthma [Shortness of Breath] : shortness of breath [Pulmonary Nodules] : pulmonary nodules [TextEntry] : 6 months. Pt states that her SOB has gotten worse worst, no other concerns at this time.

## 2024-03-08 NOTE — ASSESSMENT
[FreeTextEntry1] : 8/20/2023 Kaylin Moore is a 68-year-old female preop evaluation prior to parathyroid tumor surgery. The patient had a spirometry in the office. Spirometry shows the median predicted to be an FEV1 of 2.76 patient's best is 2.3 4, 85% of predicted which is within normal range. The mid lung flows are also normal. Patient has no significant pulmonary disease process that would affect her surgery. There is no pulmonary contraindication that proposed surgery. The patient has no increased pulmonary risk to the proposed parathyroid surgery under conscious sedation or general anesthesia. The patient will get a follow-up full pulmonary function test as previously planned in February.  3/8/2024 the patient's symptoms similar to when she presented at the end of last year.  She still able to do all of her functions but feels dragged by this feeling in her chest.  At this time she is awaiting the results of her cardiac workup.  If that workup is negative and I feel she does not have any pulmonary difficulty.  Then I would consider that some of the symptomatology may be to hyperparathyroidism as the most common symptom 59% of the patients's weakness and  fatigability.  95% of the patients are symptomatic most common along with weakness bone pain and renal calculi. Plan is for chest x-ray and consider finally getting a full PFT.  Time spent 30 minutes counseling, education, documentation, imaging reviewed, medication reviewed, inhaler demonstrated, old records reviewed, HX and PE

## 2024-04-25 ENCOUNTER — APPOINTMENT (OUTPATIENT)
Dept: SURGERY | Facility: CLINIC | Age: 69
End: 2024-04-25
Payer: MEDICARE

## 2024-04-25 VITALS
BODY MASS INDEX: 31.1 KG/M2 | OXYGEN SATURATION: 99 % | WEIGHT: 210 LBS | HEIGHT: 69 IN | HEART RATE: 71 BPM | DIASTOLIC BLOOD PRESSURE: 79 MMHG | SYSTOLIC BLOOD PRESSURE: 184 MMHG

## 2024-04-25 DIAGNOSIS — M85.80 OTHER SPECIFIED DISORDERS OF BONE DENSITY AND STRUCTURE, UNSPECIFIED SITE: ICD-10-CM

## 2024-04-25 DIAGNOSIS — E06.3 AUTOIMMUNE THYROIDITIS: ICD-10-CM

## 2024-04-25 DIAGNOSIS — E21.3 HYPERPARATHYROIDISM, UNSPECIFIED: ICD-10-CM

## 2024-04-25 DIAGNOSIS — E04.2 NONTOXIC MULTINODULAR GOITER: ICD-10-CM

## 2024-04-25 DIAGNOSIS — R76.0 RAISED ANTIBODY TITER: ICD-10-CM

## 2024-04-25 DIAGNOSIS — E55.9 VITAMIN D DEFICIENCY, UNSPECIFIED: ICD-10-CM

## 2024-04-25 PROCEDURE — 99214 OFFICE O/P EST MOD 30 MIN: CPT

## 2024-04-25 NOTE — ASSESSMENT
[FreeTextEntry1] : Assessment:  68-year-old woman, with history significant for osteopenia, mild vitamin D deficiency, Hashimoto's thyroiditis, hypothyroidism on Synthroid, and stable bilateral subcentimeter thyroid nodules, with hyperparathyroidism.  Plan: - The indications for parathyroidectomy were reviewed with Mrs. Moore and I reiterated that she has not met any of the indications to date that mandate surgery.  Considering the degree of her hypercalcemia, however, I have again offered an elective parathyroidectomy with intraoperative iPTH monitoring which is scheduled for 6/7.   - I then reminded her that she will be due for a follow-up ultrasound in July, 2025 to follow-up her stable bilateral subcentimeter thyroid nodules. - The patient expressed understanding of the above and stated that she is having second thoughts about undergoing surgery if this is not absolutely necessary.  I have therefore recommended that she have a follow-up DEXA now as this was last performed over 3 years ago.  In addition, she states that she will be having follow-up labs with her PCP later today. - Further management (viz., continued observation versus proceeding with the planned surgery) will be determined based upon the results of the follow-up DEXA and labs.

## 2024-04-25 NOTE — PHYSICAL EXAM
[R] : deviated to the right [Normal] : orientation to person, place, and time: normal [de-identified] : The neck appears flat.  There are no palpable thyroid nodules appreciated.  [de-identified] : mild [de-identified] : Extremities: NAZARIO x 4.   Skin: No obvious skin lesions.   Voice: clear

## 2024-04-25 NOTE — HISTORY OF PRESENT ILLNESS
[de-identified] : Mrs. Moore presents for follow-up.  She was evaluated by hematology and it was determined that her elevated PTT is secondary to the presence of an antiphospholipid antibody which does not increase bleeding.

## 2024-05-02 ENCOUNTER — APPOINTMENT (OUTPATIENT)
Dept: RADIOLOGY | Facility: CLINIC | Age: 69
End: 2024-05-02
Payer: MEDICARE

## 2024-05-02 ENCOUNTER — OUTPATIENT (OUTPATIENT)
Dept: OUTPATIENT SERVICES | Facility: HOSPITAL | Age: 69
LOS: 1 days | End: 2024-05-02
Payer: MEDICARE

## 2024-05-02 DIAGNOSIS — M85.80 OTHER SPECIFIED DISORDERS OF BONE DENSITY AND STRUCTURE, UNSPECIFIED SITE: ICD-10-CM

## 2024-05-02 DIAGNOSIS — Z96.651 PRESENCE OF RIGHT ARTIFICIAL KNEE JOINT: Chronic | ICD-10-CM

## 2024-05-02 DIAGNOSIS — E21.3 HYPERPARATHYROIDISM, UNSPECIFIED: ICD-10-CM

## 2024-05-02 DIAGNOSIS — Z98.890 OTHER SPECIFIED POSTPROCEDURAL STATES: Chronic | ICD-10-CM

## 2024-05-02 PROCEDURE — 77080 DXA BONE DENSITY AXIAL: CPT

## 2024-05-02 PROCEDURE — 77080 DXA BONE DENSITY AXIAL: CPT | Mod: 26

## 2024-06-07 ENCOUNTER — APPOINTMENT (OUTPATIENT)
Dept: SURGERY | Facility: HOSPITAL | Age: 69
End: 2024-06-07

## 2024-06-13 ENCOUNTER — APPOINTMENT (OUTPATIENT)
Dept: SURGERY | Facility: CLINIC | Age: 69
End: 2024-06-13

## 2024-09-09 ENCOUNTER — APPOINTMENT (OUTPATIENT)
Dept: PULMONOLOGY | Facility: CLINIC | Age: 69
End: 2024-09-09

## 2025-02-07 ENCOUNTER — NON-APPOINTMENT (OUTPATIENT)
Age: 70
End: 2025-02-07

## 2025-06-12 ENCOUNTER — NON-APPOINTMENT (OUTPATIENT)
Age: 70
End: 2025-06-12

## 2025-06-24 ENCOUNTER — NON-APPOINTMENT (OUTPATIENT)
Age: 70
End: 2025-06-24

## 2025-07-07 ENCOUNTER — APPOINTMENT (OUTPATIENT)
Dept: ULTRASOUND IMAGING | Facility: CLINIC | Age: 70
End: 2025-07-07
Payer: MEDICARE

## 2025-07-07 ENCOUNTER — OUTPATIENT (OUTPATIENT)
Dept: OUTPATIENT SERVICES | Facility: HOSPITAL | Age: 70
LOS: 1 days | End: 2025-07-07
Payer: MEDICARE

## 2025-07-07 DIAGNOSIS — E04.2 NONTOXIC MULTINODULAR GOITER: ICD-10-CM

## 2025-07-07 DIAGNOSIS — E06.3 AUTOIMMUNE THYROIDITIS: ICD-10-CM

## 2025-07-07 DIAGNOSIS — E21.3 HYPERPARATHYROIDISM, UNSPECIFIED: ICD-10-CM

## 2025-07-07 DIAGNOSIS — Z98.890 OTHER SPECIFIED POSTPROCEDURAL STATES: Chronic | ICD-10-CM

## 2025-07-07 DIAGNOSIS — Z96.651 PRESENCE OF RIGHT ARTIFICIAL KNEE JOINT: Chronic | ICD-10-CM

## 2025-07-07 PROCEDURE — 76536 US EXAM OF HEAD AND NECK: CPT

## 2025-07-07 PROCEDURE — 76536 US EXAM OF HEAD AND NECK: CPT | Mod: 26

## 2025-07-14 ENCOUNTER — NON-APPOINTMENT (OUTPATIENT)
Age: 70
End: 2025-07-14

## 2025-07-15 ENCOUNTER — NON-APPOINTMENT (OUTPATIENT)
Age: 70
End: 2025-07-15

## 2025-07-17 ENCOUNTER — NON-APPOINTMENT (OUTPATIENT)
Age: 70
End: 2025-07-17

## 2025-07-17 ENCOUNTER — APPOINTMENT (OUTPATIENT)
Dept: SURGERY | Facility: CLINIC | Age: 70
End: 2025-07-17
Payer: MEDICARE

## 2025-07-17 VITALS
OXYGEN SATURATION: 97 % | HEIGHT: 69 IN | HEART RATE: 64 BPM | SYSTOLIC BLOOD PRESSURE: 186 MMHG | DIASTOLIC BLOOD PRESSURE: 93 MMHG | WEIGHT: 200 LBS | BODY MASS INDEX: 29.62 KG/M2

## 2025-07-17 PROCEDURE — 99214 OFFICE O/P EST MOD 30 MIN: CPT
